# Patient Record
Sex: FEMALE | Race: WHITE
[De-identification: names, ages, dates, MRNs, and addresses within clinical notes are randomized per-mention and may not be internally consistent; named-entity substitution may affect disease eponyms.]

---

## 2018-01-29 ENCOUNTER — HOSPITAL ENCOUNTER (EMERGENCY)
Dept: HOSPITAL 62 - ER | Age: 63
Discharge: HOME | End: 2018-01-29
Payer: MEDICARE

## 2018-01-29 VITALS — DIASTOLIC BLOOD PRESSURE: 65 MMHG | SYSTOLIC BLOOD PRESSURE: 110 MMHG

## 2018-01-29 DIAGNOSIS — Z79.01: ICD-10-CM

## 2018-01-29 DIAGNOSIS — R55: Primary | ICD-10-CM

## 2018-01-29 DIAGNOSIS — J02.9: ICD-10-CM

## 2018-01-29 DIAGNOSIS — Z88.0: ICD-10-CM

## 2018-01-29 DIAGNOSIS — R53.81: ICD-10-CM

## 2018-01-29 DIAGNOSIS — R09.81: ICD-10-CM

## 2018-01-29 DIAGNOSIS — K58.0: ICD-10-CM

## 2018-01-29 DIAGNOSIS — J44.9: ICD-10-CM

## 2018-01-29 DIAGNOSIS — I25.2: ICD-10-CM

## 2018-01-29 DIAGNOSIS — E86.0: ICD-10-CM

## 2018-01-29 DIAGNOSIS — R07.9: ICD-10-CM

## 2018-01-29 DIAGNOSIS — I48.91: ICD-10-CM

## 2018-01-29 DIAGNOSIS — R05: ICD-10-CM

## 2018-01-29 DIAGNOSIS — Z88.5: ICD-10-CM

## 2018-01-29 DIAGNOSIS — E10.9: ICD-10-CM

## 2018-01-29 DIAGNOSIS — Z87.891: ICD-10-CM

## 2018-01-29 DIAGNOSIS — Z91.040: ICD-10-CM

## 2018-01-29 LAB
ADD MANUAL DIFF: NO
ALBUMIN SERPL-MCNC: 4 G/DL (ref 3.5–5)
ALP SERPL-CCNC: 103 U/L (ref 38–126)
ALT SERPL-CCNC: 40 U/L (ref 9–52)
ANION GAP SERPL CALC-SCNC: 13 MMOL/L (ref 5–19)
AST SERPL-CCNC: 21 U/L (ref 14–36)
BASOPHILS # BLD AUTO: 0 10^3/UL (ref 0–0.2)
BASOPHILS NFR BLD AUTO: 0.3 % (ref 0–2)
BILIRUB DIRECT SERPL-MCNC: 0.2 MG/DL (ref 0–0.4)
BILIRUB SERPL-MCNC: 0.4 MG/DL (ref 0.2–1.3)
BUN SERPL-MCNC: 9 MG/DL (ref 7–20)
CALCIUM: 9.7 MG/DL (ref 8.4–10.2)
CHLORIDE SERPL-SCNC: 102 MMOL/L (ref 98–107)
CO2 SERPL-SCNC: 23 MMOL/L (ref 22–30)
EOSINOPHIL # BLD AUTO: 0.2 10^3/UL (ref 0–0.6)
EOSINOPHIL NFR BLD AUTO: 2.6 % (ref 0–6)
ERYTHROCYTE [DISTWIDTH] IN BLOOD BY AUTOMATED COUNT: 14.7 % (ref 11.5–14)
GLUCOSE SERPL-MCNC: 122 MG/DL (ref 75–110)
HCT VFR BLD CALC: 36.4 % (ref 36–47)
HGB BLD-MCNC: 12 G/DL (ref 12–15.5)
LYMPHOCYTES # BLD AUTO: 0.8 10^3/UL (ref 0.5–4.7)
LYMPHOCYTES NFR BLD AUTO: 8.6 % (ref 13–45)
MAGNESIUM SERPL-MCNC: 1.7 MG/DL (ref 1.6–2.3)
MCH RBC QN AUTO: 28.5 PG (ref 27–33.4)
MCHC RBC AUTO-ENTMCNC: 33 G/DL (ref 32–36)
MCV RBC AUTO: 86 FL (ref 80–97)
MONOCYTES # BLD AUTO: 0.9 10^3/UL (ref 0.1–1.4)
MONOCYTES NFR BLD AUTO: 10.2 % (ref 3–13)
NEUTROPHILS # BLD AUTO: 7.3 10^3/UL (ref 1.7–8.2)
NEUTS SEG NFR BLD AUTO: 78.3 % (ref 42–78)
NT PRO BNP: 230 PG/ML (ref 5–900)
PLATELET # BLD: 287 10^3/UL (ref 150–450)
POTASSIUM SERPL-SCNC: 4.2 MMOL/L (ref 3.6–5)
PROT SERPL-MCNC: 7 G/DL (ref 6.3–8.2)
RBC # BLD AUTO: 4.22 10^6/UL (ref 3.72–5.28)
SODIUM SERPL-SCNC: 137.9 MMOL/L (ref 137–145)
TOTAL CELLS COUNTED % (AUTO): 100 %
TROPONIN I SERPL-MCNC: < 0.012 NG/ML
WBC # BLD AUTO: 9.3 10^3/UL (ref 4–10.5)

## 2018-01-29 PROCEDURE — 83880 ASSAY OF NATRIURETIC PEPTIDE: CPT

## 2018-01-29 PROCEDURE — 71045 X-RAY EXAM CHEST 1 VIEW: CPT

## 2018-01-29 PROCEDURE — 80053 COMPREHEN METABOLIC PANEL: CPT

## 2018-01-29 PROCEDURE — 36415 COLL VENOUS BLD VENIPUNCTURE: CPT

## 2018-01-29 PROCEDURE — 96361 HYDRATE IV INFUSION ADD-ON: CPT

## 2018-01-29 PROCEDURE — 84484 ASSAY OF TROPONIN QUANT: CPT

## 2018-01-29 PROCEDURE — 83735 ASSAY OF MAGNESIUM: CPT

## 2018-01-29 PROCEDURE — 93005 ELECTROCARDIOGRAM TRACING: CPT

## 2018-01-29 PROCEDURE — 96374 THER/PROPH/DIAG INJ IV PUSH: CPT

## 2018-01-29 PROCEDURE — 99285 EMERGENCY DEPT VISIT HI MDM: CPT

## 2018-01-29 PROCEDURE — 70450 CT HEAD/BRAIN W/O DYE: CPT

## 2018-01-29 PROCEDURE — 85025 COMPLETE CBC W/AUTO DIFF WBC: CPT

## 2018-01-29 PROCEDURE — 93010 ELECTROCARDIOGRAM REPORT: CPT

## 2018-01-29 NOTE — RADIOLOGY REPORT (SQ)
EXAM DESCRIPTION:  CHEST SINGLE VIEW



COMPLETED DATE/TIME:  1/29/2018 11:46 am



REASON FOR STUDY:  cp



COMPARISON:  None.



EXAM PARAMETERS:  NUMBER OF VIEWS: One view.

TECHNIQUE: Single frontal radiographic view of the chest acquired.

RADIATION DOSE: NA

LIMITATIONS: None.



FINDINGS:  LUNGS AND PLEURA: No opacities, masses or pneumothorax. No pleural effusion.

MEDIASTINUM AND HILAR STRUCTURES: No masses.  Contour normal.

HEART AND VASCULAR STRUCTURES: Heart normal in size.  Normal vasculature.

BONES: No acute findings.

HARDWARE: Hardware in the left shoulder.

OTHER: No other significant finding.



IMPRESSION:  NO ACUTE RADIOGRAPHIC FINDING IN THE CHEST.



TECHNICAL DOCUMENTATION:  JOB ID:  [id number]

 2011 [employer]- All Rights Reserved

## 2018-01-29 NOTE — ER DOCUMENT REPORT
ED General





- General


Mode of Arrival: Ambulatory


Information source: Patient


TRAVEL OUTSIDE OF THE [place] IN LAST 30 DAYS: No





<NAY BORJAS - Last Filed: 18 20:34>





<LISAPEDRO WISEMAN - Last Filed: 18 23:03>





- General


Chief Complaint: Chest Pain


Stated Complaint: CHEST PAIN


Notes: 


Patient is a 62-year-old female with a history of MI and A. fib presents to the 

emergency department complaining of multiple symptoms including a syncopal 

episode onset this morning, malaise and cough onset 1 week ago. Patient states 

that she has been feeling lightheaded with general malaise this morning and 

decided to go to her primary care doctor. Patient states that while she is at 

her primary care doctor she had a syncopal episode and proceeded to come to the 

emergency department.  Patient also complains of lightheadedness, sore throat, 

cough, diarrhea, and nasal congestion.  Patient denies any black or bloody 

stool headaches or blurry vision.





Patient states that 3 weeks ago she was having left-sided chest pain and 

numbness that radiated into her arm. She states that this has happened before 

and her cardiologist is aware and recommended  the patient take nitro when she 

has chest pain. Patient is currently on Xarelto.


 (NAY BORJAS)





- Related Data


Allergies/Adverse Reactions: 


 





codeine Allergy (Verified 18 11:50)


 


latex Allergy (Verified 18 11:50)


 


morphine Allergy (Verified 18 11:50)


 


penicillin V Allergy (Verified 18 11:50)


 


Penicillins Allergy (Verified 18 11:50)


 











Past Medical History





- General


Information source: Patient





- Social History


Smoking Status: Former Smoker


Chew tobacco use (# tins/day): No


Frequency of alcohol use: None


Family History: Reviewed & Not Pertinent


Patient has suicidal ideation: No


Patient has homicidal ideation: No





- Past Medical History


Cardiac Medical History: Reports: Hx Atrial Fibrillation, Hx 

Hypercholesterolemia


Pulmonary Medical History: Reports: Hx COPD


Endocrine Medical History: Reports: Hx Diabetes Mellitus Type 1


Past Surgical History: Reports: Hx Appendectomy, Hx  Section, Hx 

Orthopedic Surgery - L shoulder





<NAY BORJAS - Last Filed: 18 20:34>





Review of Systems





- Review of Systems


Constitutional: See HPI, Malaise


EENT: No symptoms reported


Cardiovascular: See HPI, Chest pain, Syncope, Lightheaded


Respiratory: No symptoms reported


Gastrointestinal: No symptoms reported


Genitourinary: No symptoms reported


Female Genitourinary: No symptoms reported


Musculoskeletal: No symptoms reported


Skin: No symptoms reported


Hematologic/Lymphatic: No symptoms reported


Neurological/Psychological: No symptoms reported


-: Yes All other systems reviewed and negative





<NAY BORJAS - Last Filed: 18 20:34>





Physical Exam





- General


General appearance: Appears well, Alert


In distress: None





- HEENT


Head: Normocephalic, Atraumatic


Eyes: Normal


Conjunctiva: Normal


Pupils: PERRL


Pharynx: Normal


Neck: Normal





- Respiratory


Respiratory status: No respiratory distress


Chest status: Nontender


Breath sounds: Normal





- Cardiovascular


Rhythm: Regular


Heart sounds: Normal auscultation


Murmur: No


Friction rub: No


Gallop: None auscultated





- Abdominal


Inspection: Normal


Distension: No distension


Bowel sounds: Normal


Tenderness: Nontender





- Back


Back: Normal





- Extremities


General upper extremity: Normal ROM


General lower extremity: Normal ROM





- Neurological


Neuro grossly intact: Yes


Cognition: Normal


Orientation: AAOx4


[place] Coma Scale Eye Opening: Spontaneous


[place] Coma Scale Verbal: Oriented


[place] Coma Scale Motor: Obeys Commands


[place] Coma Scale Total: 15


Speech: Normal





- Psychological


Associated symptoms: Normal affect, Normal mood





- Skin


Skin Temperature: Warm


Skin Moisture: Dry


Skin Color: Normal





<NAY BORJAS - Last Filed: 18 20:34>





- Vital signs


Vitals: 


 











Resp Pulse Ox


 


 17   96 


 


 18 11:32  18 11:32














Course





- Laboratory


Result Diagrams: 


 18 11:33





 18 11:33





<NAY BORJAS - Last Filed: 18 20:34>





- Laboratory


Result Diagrams: 


 18 11:33





 18 11:33





<PEDRO GONZALEZ - Last Filed: 18 23:03>





- Re-evaluation


Re-evalutation: 





18 15:44


Patient's labs within normal limits and trending with her baseline and not 

significant.  Workup shows no acute findings with electrolytes satisfactory.  

Patient had multiple complaints including chest discomfort but that was 

approximately 3 weeks ago has not reoccurred and screening shows no concerning 

findings on troponin or EKG.  Chest x-ray is clear as well as CT.  Patient was 

able to ambulate and her blood pressure improved with fluids.  She has been 

having 1 week of diarrhea that she contributes to her IBS.  She denies any 

abdominal pain or chest pain while in the emergency department.  Discussed need 

for fluid rehydration at her home and provided strict return precautions.  

Patient understood and agrees the plan. (PEDRO GONZALEZ)





- Vital Signs


Vital signs: 


 











Temp Pulse Resp BP Pulse Ox


 


 97.9 F      12   110/65   98 


 


 18 17:15     18 17:14  18 17:15  18 17:15














- Laboratory


Laboratory results interpreted by me: 


 











  18





  11:33 11:33


 


RDW  14.7 H 


 


Seg Neutrophils %  78.3 H 


 


Lymphocytes %  8.6 L 


 


Glucose   122 H














Discharge





<NAY BORJAS - Last Filed: 18 20:34>





<PEDRO GONZALEZ - Last Filed: 18 23:03>





- Discharge


Clinical Impression: 


 Dehydration





Syncope


Qualifiers:


 Syncope type: unspecified Qualified Code(s): R55 - Syncope and collapse





Diarrhea


Qualifiers:


 Diarrhea type: unspecified type Qualified Code(s): R19.7 - Diarrhea, 

unspecified





Condition: Stable


Disposition: HOME, SELF-CARE


Instructions:  Dehydration ([facility]), Diarrhea, Nonspecific ([facility]), Syncopal Episode 

([facility])


Additional Instructions: 


Return to the emergency department if you have any new or worsening symptoms. 

Follow up with your cardiologist to inform them of the chest pain you 

experienced several weeks ago to see if they would like to perform any tests. 


Forms:  Parent Work Note


Referrals: 


WARREN KIRKPATRICK NP [Primary Care Provider] - Follow up as needed


Scribe Attestation: 





18 23:03


I personally performed the services described documentation, reviewed and 

edited the documentation which was dictated to describe my presence, and it 

accurately records my words and actions. (PEDRO GONZALEZ)





Scribe Documentation





- Scribe


Written by Scribe:: Martinez Mock, 2018 12:28


acting as scribe for :: Lisa





<NAY BORJAS - Last Filed: 18 20:34>

## 2018-01-29 NOTE — RADIOLOGY REPORT (SQ)
EXAM DESCRIPTION:  CT HEAD WITHOUT



COMPLETED DATE/TIME:  1/29/2018 3:28 pm



REASON FOR STUDY:  headache, on xarelto



COMPARISON:  None.



TECHNIQUE:  Axial images acquired through the brain without intravenous contrast.  Images reviewed wi
th bone, brain and subdural windows.  Images stored on PACS.

All CT scanners at this facility use dose modulation, iterative reconstruction, and/or weight based d
osing when appropriate to reduce radiation dose to as low as reasonably achievable (ALARA).

[facility]: Dose Right  [facility]: CareDose    [facility]: Dose Right    [facility]: Teradose 4D    [facility]: Smart Technologies



RADIATION DOSE:  CT Rad equipment meets quality standard of care and radiation dose reduction techniq
ues were employed. CTDIvol: 64.6 mGy. DLP: 1034 mGy-cm. mGy.



LIMITATIONS:  None.



FINDINGS:  VENTRICLES: Normal size and contour.

CEREBRUM: No masses.  No hemorrhage.  No midline shift.  No evidence for acute infarction. Normal gra
y/white matter differentiation. No areas of low density in the white matter.

CEREBELLUM: No masses.  No hemorrhage.  No alteration of density.  No evidence for acute infarction.

EXTRAAXIAL SPACES: No fluid collections.  No masses.

ORBITS AND GLOBE: No intra- or extraconal masses.  Normal contour of globe without masses.

CALVARIUM: No fracture.

PARANASAL SINUSES: No fluid or mucosal thickening.

SOFT TISSUES: No mass or hematoma.

OTHER: No other significant finding.



IMPRESSION:  NORMAL BRAIN CT WITHOUT CONTRAST.

EVIDENCE OF ACUTE STROKE: NO.



COMMENT:  Quality ID # [id number]: Final reports with documentation of one or more dose reduction techniques
 (e.g., Automated exposure control, adjustment of the mA and/or kV according to patient size, use of 
iterative reconstruction technique)



TECHNICAL DOCUMENTATION:  JOB ID:  [id number]

 2011 [employer]- All Rights Reserved